# Patient Record
Sex: FEMALE | Race: WHITE | NOT HISPANIC OR LATINO | Employment: PART TIME | URBAN - METROPOLITAN AREA
[De-identification: names, ages, dates, MRNs, and addresses within clinical notes are randomized per-mention and may not be internally consistent; named-entity substitution may affect disease eponyms.]

---

## 2017-11-22 ENCOUNTER — RADIANT APPOINTMENT (OUTPATIENT)
Dept: MAMMOGRAPHY | Facility: CLINIC | Age: 37
End: 2017-11-22
Attending: OBSTETRICS & GYNECOLOGY
Payer: COMMERCIAL

## 2017-11-22 ENCOUNTER — RADIANT APPOINTMENT (OUTPATIENT)
Dept: ULTRASOUND IMAGING | Facility: CLINIC | Age: 37
End: 2017-11-22
Attending: OBSTETRICS & GYNECOLOGY
Payer: COMMERCIAL

## 2017-11-22 DIAGNOSIS — N63.20 LEFT BREAST LUMP: ICD-10-CM

## 2017-11-22 DIAGNOSIS — N64.4 BREAST PAIN, LEFT: ICD-10-CM

## 2017-11-22 PROCEDURE — G0204 DX MAMMO INCL CAD BI: HCPCS

## 2017-11-22 PROCEDURE — 76642 ULTRASOUND BREAST LIMITED: CPT | Mod: 50

## 2020-12-02 ENCOUNTER — TELEPHONE (OUTPATIENT)
Dept: DERMATOLOGY | Facility: CLINIC | Age: 40
End: 2020-12-02

## 2020-12-02 ENCOUNTER — VIRTUAL VISIT (OUTPATIENT)
Dept: DERMATOLOGY | Facility: CLINIC | Age: 40
End: 2020-12-02
Payer: COMMERCIAL

## 2020-12-02 DIAGNOSIS — L20.84 INTRINSIC ECZEMA: Primary | ICD-10-CM

## 2020-12-02 PROCEDURE — 99203 OFFICE O/P NEW LOW 30 MIN: CPT | Mod: 95 | Performed by: DERMATOLOGY

## 2020-12-02 RX ORDER — NORETHINDRONE ACETATE AND ETHINYL ESTRADIOL AND FERROUS FUMARATE 5-7-9-7
1 KIT ORAL DAILY
COMMUNITY

## 2020-12-02 RX ORDER — TRIAMCINOLONE ACETONIDE 5 MG/G
OINTMENT TOPICAL
Qty: 30 G | Refills: 11 | Status: SHIPPED | OUTPATIENT
Start: 2020-12-02

## 2020-12-02 RX ORDER — TRIAMCINOLONE ACETONIDE 0.25 MG/G
CREAM TOPICAL 2 TIMES DAILY
COMMUNITY

## 2020-12-02 NOTE — LETTER
"    12/2/2020         RE: Tiki Prado  [address] [facility] [address]        Dear Colleague,    Thank you for referring your patient, Tiki Prado, to the [facility]. Please see a copy of my visit note below.    Teledermatology Nurse Call for NEW Patients (not seen in last 3 years):     The patient was contacted by phone and we reviewed they have a visit in teledermatology upcoming with an MD or TANVI;  Importantly, \"a teledermatology visit may not be as thorough as an in-person visit, and the quality of the photograph and/or video sent may not be of the same quality as that taken by the dermatology clinic.\"     This video visit will be conducted via a call between you and your physician/provider via [employer]. We have found that certain health care needs can be provided without the need for an in-person physical exam.  This service lets us provide the care you need with a video conversation.  If a prescription is necessary we can send it directly to your pharmacy.  If lab work is needed we can place an order for that and you can then stop by our lab to have the test done at a later time. Visits are billed at different rates depending on your insurance coverage. Please reach out to your insurance provider with any questions.    The patient will also send photographs via [employer] for review. Instructions for photography are/were sent to the patient via [employer] messaging.       The patient verified the location of the photo/video visit to be [place].(The physician must be notified by nurse if the patient is not in the state of [place] during the encounter)    The patient denied skin pain, fever, mucosal symptoms(lesions, blisters, sores in the mouth, nose, eyes, or genitals) no IF PATIENT ENDORSES ANY OF THESE STOP AND PAGE ON CALL ATTENDING    Additional notes:   Patient summary of issue:rash  Location of problem on body:neck, between fingers, right eye, armpit, mole on back, has " cleared some since pictures taken, possibly stressed related, had eczema as a kid, had a fungal test that came back negative  How long has area/symptoms been present:October last a month and a half  What makes it better?:aquaphor has calmed it down, using triamcinolone on hands  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 Triamcinolone, used twice daily for 2 weeks, not improved):yes, triamcinolone, claritin  The patient has seen a dermatologist.   The patient hasno past medical history of skin cancer  ROS: The patient is generally feeling well.     Janice Esteban CHRISTINE    [facility] Dermatology Record:  [facility]      Dermatology Problem List:  1. Intrinsic eczema  - TAC 0.5% ointment for hands and feet (short duration use on other body areas if needed)  - Gentle skin care  2. NUB, left upper back: Will evaluate mole in person to see if biopsy needed.    Encounter Date: Dec 2, 2020    CC:   Chief Complaint   Patient presents with     Derm Problem     Rash       History of Present Illness:  Tiki Prado is a 40 year old female who presents for rash and mole of concern.    Tiki notes that she had severe eczema as a kid that cleared up. She thinks rashes on the skin have been triggered by increased stress. Dryness of the hands has been a long problem for her, but rashes on the neck and underarms are new. She notes rashes on the neck and underarms have actually cleared, but still having itch on the hands and feet. She notes her feet have no rash. She notes that they itch consistently nightly when she goes to bed. Once she starts scratching, she finds it hard to stop. She has a prescription for TAC 0.5% ointment that does help when she uses it on the hands, has not tried on the feet. Uses Young Living (essential oil) products. Reacts to nickel.      Mole on the back has been present for years. Not present at childhood. Does not think it is changing much, but it is dark brown and elevated, so she notices it. No symptoms.      ROS: Patient is generally feeling well today    Physical Examination:  General: Well-appearing female, appropriately-developed individual.  Skin: Focused examination including face, neck, chest, back, underarms, and hands was performed.   -Eczematous pink dermatitis symmetrically on the neck and underarms (involvs the vault and creases).  - Xerosis of the hands with pink dermatitis on the medial right fourth finger.  - No photos of feet.  - Brown dome shaped papule with atypical pigmentation on the back.    Labs:  None    Past Medical History:   Eczema history. Otherwise healthy without any major medical issues.    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     Family History:  History reviewed. No pertinent family history.    Medications:  Current Outpatient Medications   Medication     norethindrone-ethinyl estradiol-iron (ESTROSTEP FE) 1-20/1-30/1-35 MG-MCG tablet     triamcinolone (KENALOG) 0.025 % cream     triamcinolone (KENALOG) 0.5 % external ointment     No current facility-administered medications for this visit.           No Known Allergies        Impression and Recommendations (Patient Counseled on the Following):  1. Atopic dermatitis, may be worsened by ICD/ACD. Discussed eczema diagnosis. Explained need for gentle skin care. Tiki may have exacerbation of eczema due to use of essential oils. I have recommended against using these. Recommended once to twice daily moisturizer head to toe (Vanicream), gentle nonsoap cleansers (Vanicream liquid or Dove bar), and use of TAC 0.5% ointment as needed BID on hands and feet and on the body for shorter durations as needed for flares of eczema there. Gave gentle skin care handouts.    2. Atypical mole: I will evaluate this in person and determine  if biopsy is needed.      Follow-up:   Within 4 weeks for evaluation of mole     Staff only    Jeane Shaffer MD    Department of Dermatology  [facility]: Phone: [phone], Fax:[phone]  [facility]: Phone: [phone], Fax: [phone]    _____________________________________________________________________________    Teledermatology information:  - Location of patient: [place]  - Patient presented as: self referral  - Location of teledermatologist:  ([facility] )  - Image quality and interpretability: acceptable  - Physician has received verbal consent for a Video/Photos Visit from the patient? YES  - In-person dermatology visit recommendation: no  - Date of images: 11/29/20, 11/30/20  - Service start time: 8:40  - Service end time: 8:56  - Date of report: 12/2/2020       Again, thank you for allowing me to participate in the care of your patient.        Sincerely,        Jeane Shaffer MD

## 2020-12-02 NOTE — PROGRESS NOTES
"Teledermatology Nurse Call for NEW Patients (not seen in last 3 years):     The patient was contacted by phone and we reviewed they have a visit in teledermatology upcoming with an MD or TANVI;  Importantly, \"a teledermatology visit may not be as thorough as an in-person visit, and the quality of the photograph and/or video sent may not be of the same quality as that taken by the dermatology clinic.\"     This video visit will be conducted via a call between you and your physician/provider via [employer]. We have found that certain health care needs can be provided without the need for an in-person physical exam.  This service lets us provide the care you need with a video conversation.  If a prescription is necessary we can send it directly to your pharmacy.  If lab work is needed we can place an order for that and you can then stop by our lab to have the test done at a later time. Visits are billed at different rates depending on your insurance coverage. Please reach out to your insurance provider with any questions.    The patient will also send photographs via [employer] for review. Instructions for photography are/were sent to the patient via [employer] messaging.       The patient verified the location of the photo/video visit to be [place].(The physician must be notified by nurse if the patient is not in the state of [place] during the encounter)    The patient denied skin pain, fever, mucosal symptoms(lesions, blisters, sores in the mouth, nose, eyes, or genitals) no IF PATIENT ENDORSES ANY OF THESE STOP AND PAGE ON CALL ATTENDING    Additional notes:   Patient summary of issue:rash  Location of problem on body:neck, between fingers, right eye, armpit, mole on back, has cleared some since pictures taken, possibly stressed related, had eczema as a kid, had a fungal test that came back negative  How long has area/symptoms been present:October last a month and a half  What makes it better?:aquaphor has calmed it down, using " triamcinolone on hands  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 Triamcinolone, used twice daily for 2 weeks, not improved):yes, triamcinolone, claritin  The patient has seen a dermatologist.   The patient hasno past medical history of skin cancer  ROS: The patient is generally feeling well.     Janice Esteban LPN    [facility] Dermatology Record:  [facility]      Dermatology Problem List:  1. Intrinsic eczema  - TAC 0.5% ointment for hands and feet (short duration use on other body areas if needed)  - Gentle skin care  2. NUB, left upper back: Will evaluate mole in person to see if biopsy needed.    Encounter Date: Dec 2, 2020    CC:   Chief Complaint   Patient presents with     Derm Problem     Rash       History of Present Illness:  Tiki Prado is a 40 year old female who presents for rash and mole of concern.    Tiki notes that she had severe eczema as a kid that cleared up. She thinks rashes on the skin have been triggered by increased stress. Dryness of the hands has been a long problem for her, but rashes on the neck and underarms are new. She notes rashes on the neck and underarms have actually cleared, but still having itch on the hands and feet. She notes her feet have no rash. She notes that they itch consistently nightly when she goes to bed. Once she starts scratching, she finds it hard to stop. She has a prescription for TAC 0.5% ointment that does help when she uses it on the hands, has not tried on the feet. Uses Young Living (essential oil) products. Reacts to nickel.     Mole on the back has been present for years. Not present at childhood. Does not think it is changing much, but it is dark brown and elevated, so she notices it. No symptoms.      ROS: Patient is generally feeling well today    Physical  Examination:  General: Well-appearing female, appropriately-developed individual.  Skin: Focused examination including face, neck, chest, back, underarms, and hands was performed.   -Eczematous pink dermatitis symmetrically on the neck and underarms (involvs the vault and creases).  - Xerosis of the hands with pink dermatitis on the medial right fourth finger.  - No photos of feet.  - Brown dome shaped papule with atypical pigmentation on the back.    Labs:  None    Past Medical History:   Eczema history. Otherwise healthy without any major medical issues.    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     Family History:  History reviewed. No pertinent family history.    Medications:  Current Outpatient Medications   Medication     norethindrone-ethinyl estradiol-iron (ESTROSTEP FE) 1-20/1-30/1-35 MG-MCG tablet     triamcinolone (KENALOG) 0.025 % cream     triamcinolone (KENALOG) 0.5 % external ointment     No current facility-administered medications for this visit.           No Known Allergies        Impression and Recommendations (Patient Counseled on the Following):  1. Atopic dermatitis, may be worsened by ICD/ACD. Discussed eczema diagnosis. Explained need for gentle skin care. Tiki may have exacerbation of eczema due to use of essential oils. I have recommended against using these. Recommended once to twice daily moisturizer head to toe (Vanicream), gentle nonsoap cleansers (Vanicream liquid or Dove bar), and use of TAC 0.5% ointment as needed BID on hands and feet and on the body for shorter durations as needed for flares of eczema there. Gave gentle skin care handouts.    2. Atypical mole: I will evaluate this in person and determine if biopsy is needed.      Follow-up:   Within 4 weeks for evaluation of mole     Staff only    Jeane Shaffer MD    Department of Dermatology  [facility]:  Phone: [phone], Fax:[phone]  [facility]: Phone: [phone], Fax: [phone]    _____________________________________________________________________________    Teledermatology information:  - Location of patient: [place]  - Patient presented as: self referral  - Location of teledermatologist:  [facility] )  - Image quality and interpretability: acceptable  - Physician has received verbal consent for a Video/Photos Visit from the patient? YES  - In-person dermatology visit recommendation: no  - Date of images: 11/29/20, 11/30/20  - Service start time: 8:40  - Service end time: 8:56  - Date of report: 12/2/2020

## 2020-12-02 NOTE — PATIENT INSTRUCTIONS
[facility] Dermatology Visit    Thank you for allowing us to participate in your care. Your findings, instructions and follow-up plan are as follows:    1. Mole - will evaluate in person and see if biopsy needed    2. Eczema - care plan below  - Moisturize once to twice daily with Vanicream  - Switch to Dove bar soap for sensitive skin or Vanicream gentle facial cleanser as your in shower wash  - Use triamcinolone 0.5% ointment twice daily on hands and feet when itchy. After applying at bedtime, put Vaseline or Aquaphor over top and then cover with white cotton gloves/cotton socks overnight to let the moisture soak in. You can do this when flared to get better results!  - For eczema outside the hands and feet, only use the triamcinolone for short periods of time (like max of 3 days in a row)    Dry Skin    What is dry skin?    Common skin problem    Can be worse during the winter     Affects all ages    Occurs in people with or without other skin problems    What does it look like?    Fine lines in the skin become more visible     Rough feeling skin     Flaky skin    Most common on the arms and legs    Skin can become cracked, especially on the hands and feet    What are some problems caused by dry skin?     Itching    Rubbing or scratching can cause thickened, rough skin patches    Cracks in skin can be painful    Red, itchy, scaly skin (called eczema) can occur    Yellow crusting or pus could be signs of an infection    What causes dry skin?    A lack of water in the top layer of the skin    Too much soapy water,  hot water, or harsh chemicals    Aging and sun damage    How do I treat dry skin?    Shower or bathe daily for under ten minutes with lukewarm water and mild soap.    Pat yourself dry with a towel gently and leave your skin slightly damp.    Use moisturizing cream or ointment right away.  Avoid lotions.    What kind of mild soap should I be using?    Camay , Dove , Tone , Neutrogena ,  Purpose , or Oil of Olay     A non-detergent cleanser, like Cetaphil , can be used.    What should I stay away from?    Scented soaps     Bath oils    What moisturizers should I be using?    Cetaphil Cream,CeraVe Cream, Vanicream, Aquaphilic, Eucerin, Aquaphor, or Vaseline     Always apply after showering or bathing.    Reapply throughout the day, if possible.    If dry skin affects your hands, always reapply after handwashing.    What else should I know?    Using a humidifier during winter months may help.    If dry skin gets worse or if eczema develops, a steroid cream may be needed.      Gentle Skin Care  Below is a list of products our providers recommend for gentle skin care.  Moisturizers:    Lighter; Cetaphil Cream, CeraVe, Aveeno and Vanicream Light     Thicker; Aquaphor Ointment, Vaseline, Petrolium Jelly, Eucerin and Vanicream    Avoid Lotions (too thin)  Mild Cleansers:    Dove- Fragrance Free    CeraVe     Vanicream Cleansing Bar    Cetaphil Cleanser     Aquaphor 2 in1 Gentle Wash and Shampoo       Laundry Products:    All Free and Clear    Cheer Free    Generic Brands are okay as long as they are  Fragrance Free      Avoid fabric softeners  and dryer sheets   Sunscreens: SPF 30 or greater     Sunscreens that contain Zinc Oxide or Titanium Dioxide should be applied, these are physical blockers. Spray or  chemical  sunscreens should be avoided.        Shampoo and Conditioners:    Free and Clear by Vanicream    Aquaphor 2 in 1 Gentle Wash and Shampoo Oils:    Mineral Oil     Emu Oil     For some patients, coconut and sunflower seed oil      Generic Products are an okay substitute, but make sure they are fragrance free.  *Avoid product that have fragrance added to them. Organic does not mean  fragrance free.  In fact patients with sensitive skin can become quite irritated by organic products.     1. Daily bathing is recommended. Make sure you are applying a good moisturizer after bathing every time.  2. Use  Moisturizing creams at least twice daily to the whole body.   3. Creams are more moisturizing than lotions  4. Products should be fragrance free- soaps, creams, detergents.   Care Plan:  1. Keep bathing and showering short, less than 15 minutes   2. Always use lukewarm warm when possible. AVOID very HOT or COLD water  3. DO NOT use bubble bath  4. Limit the use of soaps. Focus on the skin folds, face, armpits, groin and feet  5. Do NOT vigorously scrub when you cleanse your skin  6. After bathing, PAT your skin lightly with a towel. DO NOT rub or scrub when drying  7. ALWAYS apply a moisturizer immediately after bathing. This helps to  lock in  the moisture.  8. Reapply moisturizing agents at least twice daily to your whole body  9. Do not use products such as powders, perfumes, or colognes on your skin  10. Avoid saunas and steam baths. This temperature is too HOT  11. Avoid tight or  scratchy  clothing such as wool  12. Always wash new clothing before wearing them for the first time  13. Sometimes a humidifier or vaporizer can be used at night can help the dry skin. Remember to keep it clean to avoid mold growth.            When should I call my doctor?    If you are worsening or not improving, please, contact us or seek urgent care as noted below.     Who should I call with questions (adults)?    [facility] (adult and pediatric): [phone]     [facility] (adult): [phone]    For urgent needs outside of business hours call the [facility] at [phone] and ask for the dermatology resident on call    If this is a medical emergency and you are unable to reach an ER, Call [phone]      Who should I call with questions (pediatric)?  [facility]- Pediatric Dermatology  Dr. Sasha Tovar, Dr. Arnoldo Robledo, Dr. Nina Goodson, Addie Bocanegra, MACIEJ Zheng, Dr. Blaire Schuler & Dr. Hardik Bullock  Non Urgent  Nurse  Triage Line; [phone]- Sana and Laurie RN Care Coordinators   Nancy (/Complex ) [phone]    If you need a prescription refill, please contact your pharmacy. Refills are approved or denied by our Physicians during normal business hours, Monday through Fridays  Per office policy, refills will not be granted if you have not been seen within the past year (or sooner depending on your child's condition)    Scheduling Information:  Pediatric Appointment Scheduling and Call Center [phone]  Radiology Scheduling- [phone]  Sedation Unit Scheduling- [phone]  [place] Scheduling- General [phone]; Pediatric Dermatology [phone]  Main  Services: [phone]  Swedish: [phone]  Nigerian: [phone]  Hmong/Ecuadorean/Arie: [phone]  Preadmission Nursing Department Fax Number: [phone] (Fax all pre-operative paperwork to this number)    For urgent matters arising during evenings, weekends, or holidays that cannot wait for normal business hours please call [phone] and ask for the Dermatology Resident On-Call to be paged.

## 2020-12-02 NOTE — TELEPHONE ENCOUNTER
Jeane Shaffer MD  P [facility] Dermatology Adult [place]             Patient needs in person visit with me to evaluate a mole within the next 4 weeks (ideally within 2 weeks).     Thanks,  LF      Patient scheduled with Dr. Shaffer on 12/10 for in person appointment.  Saima Sanabria LPN

## 2020-12-02 NOTE — Clinical Note
Patient needs in person visit with me to evaluate a mole within the next 4 weeks (ideally within 2 weeks).    Thanks, LF

## 2020-12-10 ENCOUNTER — OFFICE VISIT (OUTPATIENT)
Dept: DERMATOLOGY | Facility: CLINIC | Age: 40
End: 2020-12-10
Payer: COMMERCIAL

## 2020-12-10 DIAGNOSIS — L57.8 SUN-DAMAGED SKIN: Primary | ICD-10-CM

## 2020-12-10 DIAGNOSIS — L85.8 KP (KERATOSIS PILARIS): ICD-10-CM

## 2020-12-10 DIAGNOSIS — D18.01 CHERRY ANGIOMA: ICD-10-CM

## 2020-12-10 DIAGNOSIS — L82.1 SEBORRHEIC KERATOSIS: ICD-10-CM

## 2020-12-10 DIAGNOSIS — L72.0 MILIA: ICD-10-CM

## 2020-12-10 DIAGNOSIS — D22.9 MULTIPLE BENIGN NEVI: ICD-10-CM

## 2020-12-10 DIAGNOSIS — L81.4 SOLAR LENTIGO: ICD-10-CM

## 2020-12-10 PROCEDURE — 99214 OFFICE O/P EST MOD 30 MIN: CPT | Performed by: DERMATOLOGY

## 2020-12-10 RX ORDER — DEXTROAMPHETAMINE SACCHARATE, AMPHETAMINE ASPARTATE, DEXTROAMPHETAMINE SULFATE AND AMPHETAMINE SULFATE 2.5; 2.5; 2.5; 2.5 MG/1; MG/1; MG/1; MG/1
10 TABLET ORAL
COMMUNITY

## 2020-12-10 NOTE — PROGRESS NOTES
"[facility] Dermatology Note    Dermatology Problem List:  1. Intrinsic eczema  - TAC 0.5% ointment for hands and feet (short duration use on other body areas if needed)  - Gentle skin care  2. SK, left mid back    Encounter Date: Dec 10, 2020    CC:  Chief Complaint   Patient presents with     Skin Check     FBSE, possible biopsy of mole on left upper back       History of Present Illness:  Ms. Fairbanks \"Marianne\" Johan is a 40 year old female who presents for evaluation of a mole on her left back and skin check.    She was seen virtually on 12/2/20. At that time, decision was made to evaluate in person.    Today she notes no changes to the mole since virtual visit. She has no other skin concerns today but notes that she has an increased number of moles.    No other concerns addressed today.    Past Medical History:   Eczema history. Otherwise healthy without any major medical issues.    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   Reviewed and left in chart for clinician convenience.    Family History:  Not assessed today.    Medications:  Current Outpatient Medications   Medication Sig Dispense Refill     norethindrone-ethinyl estradiol-iron (ESTROSTEP FE) 1-20/1-30/1-35 MG-MCG tablet Take 1 tablet by mouth daily       triamcinolone (KENALOG) 0.025 % cream Apply topically 2 times daily       triamcinolone (KENALOG) 0.5 % external ointment Apply thin layer twice daily as needed to eczema on hands and feet. 30 g 11       Allergies   Allergen Reactions     Nickel Hives and Rash     Starts to bleed and itches.  Starts to bleed and itches.       Latex Rash     On hands  On hands         Review of Systems:  -Constitutional: Patient is otherwise feeling well, in usual state of health.   -Skin: As above in HPI. No additional skin concerns.      Physical exam:  Vitals: There were no vitals taken for this visit.  GEN: This is a well developed, well-nourished female in no " acute distress, in a pleasant mood.    SKIN: Total skin excluding the undergarment areas was performed. The exam included the head/face, neck, both arms, chest, back, abdomen, both legs, buttocks, digits and/or nails.   - Pimentel Type II  - Scattered brown macules on sun exposed areas.  - There are dome shaped bright red papules on the back.   - Multiple regular brown pigmented macules and papules are identified on the sun exposed areas.   - Solar lentigines in sun exposed areas  - A millia in the left medial canthus  - There is a waxy stuck on tan to brown papule on the left mid back.  Multiple regular brown pigmented macules and papules are identified. About 75 nevi in all, majority on extremities, all less than 5 mm in size, none have significant atypia.  - Pink follicular based papules on the thighs bilaterally.  - No other lesions of concern on areas examined.     Impression/Plan:    1. Sun damaged skin with solar lentigines  - Recommend sunscreens SPF #30 or greater, protective clothing and avoidance of tanning beds.    2. Benign findings including: seborrheic keratoses, cherry angioma, milia  - No further intervention required. Patient to report changes.   - Patient reassured of the benign nature of these lesions.    3. Multiple clinically benign nevi  - No further intervention required. Patient to report changes.   - Patient reassured of the benign nature of these lesions.    4. Keratosis pilaris  - Recommended Cerave-SA cream    Follow-up in 1-2 years for FBSE, earlier for new or changing lesions.     Staff Involved:  Staff and scribe    Scribe Disclosure:   I, Cristobal Meehan, am serving as a scribe to document services personally performed by this physician, Dr. Jeane Shaffer, based on data collection and the provider's statements to me.     Provider Disclosure:   The documentation recorded by the scribe accurately reflects the services I personally performed and the decisions made by me.    Jeane  MD Edmund    Department of Dermatology  [facility]: Phone: [phone], Fax:[phone]  [facility]: Phone: [phone], Fax: [phone]

## 2020-12-10 NOTE — LETTER
"    12/10/2020         RE: Tiki Prado  [address] [address]        Dear Colleague,    Thank you for referring your patient, Tiki Prado, to the [facility]. Please see a copy of my visit note below.    [facility] Dermatology Note    Dermatology Problem List:  1. Intrinsic eczema  - TAC 0.5% ointment for hands and feet (short duration use on other body areas if needed)  - Gentle skin care  2. SK, left mid back    Encounter Date: Dec 10, 2020    CC:  Chief Complaint   Patient presents with     Skin Check     FBSE, possible biopsy of mole on left upper back       History of Present Illness:  Ms. Fairbanks \"Marianne\" Johan is a 40 year old female who presents for evaluation of a mole on her left back and skin check.    She was seen virtually on 12/2/20. At that time, decision was made to evaluate in person.    Today she notes no changes to the mole since virtual visit. She has no other skin concerns today but notes that she has an increased number of moles.    No other concerns addressed today.    Past Medical History:   Eczema history. Otherwise healthy without any major medical issues.    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   Reviewed and left in chart for clinician convenience.    Family History:  Not assessed today.    Medications:  Current Outpatient Medications   Medication Sig Dispense Refill     norethindrone-ethinyl estradiol-iron (ESTROSTEP FE) 1-20/1-30/1-35 MG-MCG tablet Take 1 tablet by mouth daily       triamcinolone (KENALOG) 0.025 % cream Apply topically 2 times daily       triamcinolone (KENALOG) 0.5 % external ointment Apply thin layer twice daily as needed to eczema on hands and feet. 30 g 11       Allergies   Allergen Reactions     Nickel Hives and Rash     Starts to bleed and itches.  Starts to bleed and itches.       Latex Rash     On hands  On hands         Review of Systems:  -Constitutional: " Patient is otherwise feeling well, in usual state of health.   -Skin: As above in HPI. No additional skin concerns.      Physical exam:  Vitals: There were no vitals taken for this visit.  GEN: This is a well developed, well-nourished female in no acute distress, in a pleasant mood.    SKIN: Total skin excluding the undergarment areas was performed. The exam included the head/face, neck, both arms, chest, back, abdomen, both legs, buttocks, digits and/or nails.   - Pimentel Type II  - Scattered brown macules on sun exposed areas.  - There are dome shaped bright red papules on the back.   - Multiple regular brown pigmented macules and papules are identified on the sun exposed areas.   - Solar lentigines in sun exposed areas  - A millia in the left medial canthus  - There is a waxy stuck on tan to brown papule on the left mid back.  Multiple regular brown pigmented macules and papules are identified. About 75 nevi in all, majority on extremities, all less than 5 mm in size, none have significant atypia.  - Pink follicular based papules on the thighs bilaterally.  - No other lesions of concern on areas examined.     Impression/Plan:    1. Sun damaged skin with solar lentigines  - Recommend sunscreens SPF #30 or greater, protective clothing and avoidance of tanning beds.    2. Benign findings including: seborrheic keratoses, cherry angioma, milia  - No further intervention required. Patient to report changes.   - Patient reassured of the benign nature of these lesions.    3. Multiple clinically benign nevi  - No further intervention required. Patient to report changes.   - Patient reassured of the benign nature of these lesions.    4. Keratosis pilaris  - Recommended Cerave-SA cream    Follow-up in 1-2 years for FBSE, earlier for new or changing lesions.     Staff Involved:  Staff and scribe    Scribe Disclosure:   Cristobal BROUSSARD, am serving as a scribe to document services personally performed by this  physician, Dr. Jeane Shaffer, based on data collection and the provider's statements to me.     Provider Disclosure:   The documentation recorded by the scribe accurately reflects the services I personally performed and the decisions made by me.    Jeane Shaffer MD    Department of Dermatology  [facility] [place]: Phone: [phone], Fax:[phone]  [facility]: Phone: [phone], Fax: [phone]                Again, thank you for allowing me to participate in the care of your patient.        Sincerely,        Jeane Shaffer MD

## 2020-12-10 NOTE — NURSING NOTE
Tiki Prado's goals for this visit include:   Chief Complaint   Patient presents with     Skin Check     FBSE, possible biopsy of mole on left upper back     She requests these members of her care team be copied on today's visit information: No    PCP: No Ref-Primary, Physician    Referring Provider:  No referring provider defined for this encounter.    There were no vitals taken for this visit.    Do you need any medication refills at today's visit? No    Ansley Sandy LPN

## 2020-12-27 ENCOUNTER — HEALTH MAINTENANCE LETTER (OUTPATIENT)
Age: 40
End: 2020-12-27

## 2021-10-09 ENCOUNTER — HEALTH MAINTENANCE LETTER (OUTPATIENT)
Age: 41
End: 2021-10-09

## 2022-01-29 ENCOUNTER — HEALTH MAINTENANCE LETTER (OUTPATIENT)
Age: 42
End: 2022-01-29

## 2022-03-16 ENCOUNTER — ANCILLARY PROCEDURE (OUTPATIENT)
Dept: MAMMOGRAPHY | Facility: CLINIC | Age: 42
End: 2022-03-16
Attending: OBSTETRICS & GYNECOLOGY
Payer: COMMERCIAL

## 2022-03-16 DIAGNOSIS — Z12.31 VISIT FOR SCREENING MAMMOGRAM: ICD-10-CM

## 2022-03-16 PROCEDURE — 77067 SCR MAMMO BI INCL CAD: CPT | Mod: GC | Performed by: RADIOLOGY

## 2022-05-24 PROCEDURE — 88305 TISSUE EXAM BY PATHOLOGIST: CPT | Mod: TC,ORL | Performed by: OBSTETRICS & GYNECOLOGY

## 2022-05-25 ENCOUNTER — LAB REQUISITION (OUTPATIENT)
Dept: LAB | Facility: CLINIC | Age: 42
End: 2022-05-25
Payer: COMMERCIAL

## 2022-05-26 LAB
PATH REPORT.COMMENTS IMP SPEC: NORMAL
PATH REPORT.FINAL DX SPEC: NORMAL
PATH REPORT.GROSS SPEC: NORMAL
PATH REPORT.MICROSCOPIC SPEC OTHER STN: NORMAL
PATH REPORT.RELEVANT HX SPEC: NORMAL
PHOTO IMAGE: NORMAL

## 2022-05-26 PROCEDURE — 88305 TISSUE EXAM BY PATHOLOGIST: CPT | Mod: 26 | Performed by: PATHOLOGY

## 2022-09-17 ENCOUNTER — HEALTH MAINTENANCE LETTER (OUTPATIENT)
Age: 42
End: 2022-09-17

## 2023-04-19 ENCOUNTER — ANCILLARY PROCEDURE (OUTPATIENT)
Dept: MAMMOGRAPHY | Facility: CLINIC | Age: 43
End: 2023-04-19
Payer: COMMERCIAL

## 2023-04-19 DIAGNOSIS — Z12.31 VISIT FOR SCREENING MAMMOGRAM: ICD-10-CM

## 2023-04-19 PROCEDURE — 77063 BREAST TOMOSYNTHESIS BI: CPT | Performed by: RADIOLOGY

## 2023-04-19 PROCEDURE — 77067 SCR MAMMO BI INCL CAD: CPT | Performed by: RADIOLOGY

## 2023-05-06 ENCOUNTER — HEALTH MAINTENANCE LETTER (OUTPATIENT)
Age: 43
End: 2023-05-06

## 2024-07-13 ENCOUNTER — HEALTH MAINTENANCE LETTER (OUTPATIENT)
Age: 44
End: 2024-07-13

## 2025-06-28 ENCOUNTER — HEALTH MAINTENANCE LETTER (OUTPATIENT)
Age: 45
End: 2025-06-28

## 2025-07-19 ENCOUNTER — HEALTH MAINTENANCE LETTER (OUTPATIENT)
Age: 45
End: 2025-07-19